# Patient Record
Sex: FEMALE | Race: WHITE | NOT HISPANIC OR LATINO | Employment: FULL TIME | URBAN - NONMETROPOLITAN AREA
[De-identification: names, ages, dates, MRNs, and addresses within clinical notes are randomized per-mention and may not be internally consistent; named-entity substitution may affect disease eponyms.]

---

## 2021-11-01 ENCOUNTER — OFFICE VISIT (OUTPATIENT)
Dept: ORTHOPEDIC SURGERY | Facility: CLINIC | Age: 51
End: 2021-11-01

## 2021-11-01 ENCOUNTER — HOSPITAL ENCOUNTER (OUTPATIENT)
Dept: GENERAL RADIOLOGY | Facility: HOSPITAL | Age: 51
Discharge: HOME | End: 2021-11-01
Admitting: PHYSICIAN ASSISTANT

## 2021-11-01 VITALS — HEIGHT: 66 IN | WEIGHT: 250 LBS | TEMPERATURE: 97 F | BODY MASS INDEX: 40.18 KG/M2

## 2021-11-01 DIAGNOSIS — M79.641 RIGHT HAND PAIN: Primary | ICD-10-CM

## 2021-11-01 DIAGNOSIS — M67.441 GANGLION, FINGER JOINT OF RIGHT HAND: Primary | ICD-10-CM

## 2021-11-01 PROCEDURE — 73130 X-RAY EXAM OF HAND: CPT

## 2021-11-01 PROCEDURE — 99203 OFFICE O/P NEW LOW 30 MIN: CPT | Performed by: PHYSICIAN ASSISTANT

## 2021-11-01 RX ORDER — CETIRIZINE HYDROCHLORIDE 10 MG/1
TABLET ORAL
COMMUNITY

## 2021-11-01 RX ORDER — MULTIVIT WITH MINERALS/LUTEIN
250 TABLET ORAL DAILY
COMMUNITY

## 2021-11-01 RX ORDER — LISINOPRIL 20 MG/1
20 TABLET ORAL
COMMUNITY

## 2021-11-01 RX ORDER — PHENOL 1.4 %
600 AEROSOL, SPRAY (ML) MUCOUS MEMBRANE DAILY
COMMUNITY

## 2021-11-01 RX ORDER — BUSPIRONE HYDROCHLORIDE 10 MG/1
TABLET ORAL
COMMUNITY

## 2021-11-01 RX ORDER — PSEUDOEPHEDRINE HCL 30 MG
TABLET ORAL
COMMUNITY

## 2021-11-01 NOTE — PROGRESS NOTES
"Chief Complaint  Pain of the Right Hand and Establish Care    Subjective    History of Present Illness      Megan Wang is a 51 y.o. female who presents to [facility] for complaint of a tender nodule on the thumb of the right hand. She reports it has been present for approximately 2 years without a known inciting event. She is only having pain when the area is accidentally hit. She has not noticed much change in the nodule, possibly a slight increase in size. It has never resolved then returned, it is constantly present.        Objective   Vital Signs:   Temp 97 °F (36.1 °C)   Ht 167.6 cm (66\")   Wt 113 kg (250 lb)   BMI 40.35 kg/m²     Physical Exam  Vitals signs and nursing note reviewed.   Constitutional:       Appearance: Normal appearance.   Pulmonary:      Effort: Pulmonary effort is normal.   Skin:     General: Skin is warm and dry.      Capillary Refill: Capillary refill takes less than 2 seconds.   Neurological:      General: No focal deficit present.      Mental Status: He is alert and oriented to person, place, and time. Mental status is at baseline.   Psychiatric:         Mood and Affect: Mood normal.         Behavior: Behavior normal.         Thought Content: Thought content normal.         Judgment: Judgment normal.     Ortho Exam   RIGHT hand  The patient is right hand dominant.   There is a tiny translucent mass about 0.5cm in diameter noted over the dorsum of the MCP joint of the thumb, more radial.  There are no abnormal pulsations or bruits.   Neurovascular status is intact.   Median nerve function is well preserved.   Local tenderness is diffuse and ill-defined and there is an aching sensation upon direct palpation of the wrist.   Radial artery pulses are palpable. Median and ulnar nerve functions are both well preserved.   There is no evidence of erosive changes into the underlying bone and the symptoms on palpation are essentially in the soft " tissues.      Result Review :   Radiologic studies - see below for interpretation  RIGHT hand xrays   3 views were ordered by Orlando Dickinson PA-C. Performed at [facility] on 11/01/2021. Images were independently viewed and interpreted by myself, my impression as follows:  Findings: Mild degenerative changes of the CMC joint of the thumb  Bony lesion: no  Soft tissues: within normal limits  Joint spaces: subnormal  Hardware appropriately positioned: not applicable  Prior studies available for comparison: no           PROCEDURE  Procedures           Assessment   Assessment and Plan    Problem List Items Addressed This Visit        Musculoskeletal and Injuries    Ganglion, finger joint of right hand - Primary          Follow Up   · Discussion of any imaging in detail. Discussion of orthopaedic goals.  · Risk, benefits, and merits of treatment alternatives reviewed with the patient. Treatment alternatives include: NSAIDS, surgery with excision. At this point it is not bothering her enough to have it removed. She reports she just wanted to make sure it is nothing she should be concerned over.   · Ice, heat, and/or modalities as beneficial  · Patient is encouraged to call or return for any issues or concerns.  · Follow up as needed  • Patient was given instructions and counseling regarding her condition or for health maintenance advice. Please see specific information pulled into the AVS if appropriate.     Orlando Dickinson PA-C   Date of Encounter: 11/1/2021   Electronically signed by Orlando Dickinson PA-C, 11/01/21, 9:28 AM EDT.     EMR Dragon/Transcription disclaimer:  Much of this encounter note is an electronic transcription/translation of spoken language to printed text. The electronic translation of spoken language may permit erroneous, or at times, nonsensical words or phrases to be inadvertently transcribed; Although I have reviewed the note for such errors, some may still  exist.

## 2021-11-05 ENCOUNTER — PATIENT ROUNDING (BHMG ONLY) (OUTPATIENT)
Dept: ORTHOPEDIC SURGERY | Facility: CLINIC | Age: 51
End: 2021-11-05

## 2021-11-05 NOTE — PROGRESS NOTES
November 5, 2021    Hello, may I speak with Megan Wang?    My name is Zenia Davis        I am  with [facility]  [address] [facility] [address]  [facility] [address] [phone].    Before we get started may I verify your date of birth? 1970    I am calling to officially welcome you to our practice and ask about your recent visit. Is this a good time to talk? yes    Tell me about your visit with us. What things went well?  Everything was just fine.        We're always looking for ways to make our patients' experiences even better. Do you have recommendations on ways we may improve?  no    Overall were you satisfied with your first visit to our practice? yes       I appreciate you taking the time to speak with me today. Is there anything else I can do for you? no      Thank you, and have a great day.

## 2022-10-27 ENCOUNTER — OFFICE VISIT (OUTPATIENT)
Dept: ORTHOPEDIC SURGERY | Facility: CLINIC | Age: 52
End: 2022-10-27

## 2022-10-27 VITALS — WEIGHT: 245 LBS | TEMPERATURE: 97.6 F | HEIGHT: 66 IN | BODY MASS INDEX: 39.37 KG/M2

## 2022-10-27 DIAGNOSIS — M67.441 GANGLION, FINGER JOINT OF RIGHT HAND: Primary | ICD-10-CM

## 2022-10-27 PROCEDURE — 99214 OFFICE O/P EST MOD 30 MIN: CPT | Performed by: ORTHOPAEDIC SURGERY

## 2022-11-22 ENCOUNTER — PREP FOR SURGERY (OUTPATIENT)
Dept: OTHER | Facility: HOSPITAL | Age: 52
End: 2022-11-22

## 2022-11-22 ENCOUNTER — TELEPHONE (OUTPATIENT)
Dept: ORTHOPEDIC SURGERY | Facility: CLINIC | Age: 52
End: 2022-11-22

## 2022-11-22 DIAGNOSIS — M67.441 GANGLION, FINGER JOINT OF RIGHT HAND: Primary | ICD-10-CM

## 2022-11-22 RX ORDER — CLINDAMYCIN PHOSPHATE 900 MG/50ML
900 INJECTION INTRAVENOUS ONCE
Status: CANCELLED | OUTPATIENT
Start: 2022-12-23 | End: 2022-11-22

## 2022-11-28 ENCOUNTER — TELEPHONE (OUTPATIENT)
Dept: ORTHOPEDIC SURGERY | Facility: CLINIC | Age: 52
End: 2022-11-28

## 2022-11-28 NOTE — TELEPHONE ENCOUNTER
Explained to patient that Dr Sue does not do surgery at [place] now.  She would like to schedule excision of ganglion as soon as possible.  She hopes for before the end of the year.   I did explain that may not be possible as those dates book up pretty quickly.   She understands and is awaiting a call back.     bsw

## 2022-11-28 NOTE — TELEPHONE ENCOUNTER
Caller: Megan Wang    Relationship: Self    Best call back number:     What is the best time to reach you: ANY     Who are you requesting to speak with (clinical staff, provider,  specific staff member): CLINICAL    What was the call regarding: PATIENT WOULD LIKE TO MOVE SURGERY FROM [place] TO [place] AT [facility]     Do you require a callback: YES

## 2022-12-05 DIAGNOSIS — Z01.818 PRE-OP EXAMINATION: ICD-10-CM

## 2022-12-05 DIAGNOSIS — M67.441 GANGLION, FINGER JOINT OF RIGHT HAND: Primary | ICD-10-CM

## 2022-12-15 ENCOUNTER — APPOINTMENT (OUTPATIENT)
Dept: PREADMISSION TESTING | Facility: HOSPITAL | Age: 52
End: 2022-12-15

## 2022-12-22 ENCOUNTER — ANESTHESIA EVENT (OUTPATIENT)
Dept: PERIOP | Facility: HOSPITAL | Age: 52
End: 2022-12-22

## 2022-12-22 RX ORDER — PHENTERMINE HYDROCHLORIDE 15 MG/1
15 CAPSULE ORAL EVERY MORNING
COMMUNITY
Start: 2022-11-30

## 2022-12-22 RX ORDER — TIRZEPATIDE 7.5 MG/.5ML
INJECTION, SOLUTION SUBCUTANEOUS WEEKLY
COMMUNITY
Start: 2022-11-09

## 2022-12-22 RX ORDER — AMLODIPINE BESYLATE 5 MG/1
5 TABLET ORAL DAILY
COMMUNITY

## 2022-12-22 RX ORDER — FAMOTIDINE 20 MG/1
20 TABLET, FILM COATED ORAL DAILY
COMMUNITY
Start: 2022-11-30

## 2022-12-23 ENCOUNTER — HOSPITAL ENCOUNTER (OUTPATIENT)
Facility: HOSPITAL | Age: 52
Setting detail: HOSPITAL OUTPATIENT SURGERY
Discharge: HOME | End: 2022-12-23
Attending: ORTHOPAEDIC SURGERY | Admitting: ORTHOPAEDIC SURGERY

## 2022-12-23 ENCOUNTER — ANESTHESIA (OUTPATIENT)
Dept: PERIOP | Facility: HOSPITAL | Age: 52
End: 2022-12-23

## 2022-12-23 VITALS
WEIGHT: 229.28 LBS | BODY MASS INDEX: 36.85 KG/M2 | OXYGEN SATURATION: 98 % | DIASTOLIC BLOOD PRESSURE: 85 MMHG | HEART RATE: 74 BPM | TEMPERATURE: 97.6 F | HEIGHT: 66 IN | RESPIRATION RATE: 16 BRPM | SYSTOLIC BLOOD PRESSURE: 137 MMHG

## 2022-12-23 DIAGNOSIS — M67.441 GANGLION, FINGER JOINT OF RIGHT HAND: Primary | ICD-10-CM

## 2022-12-23 DIAGNOSIS — M67.441 GANGLION, FINGER JOINT OF RIGHT HAND: ICD-10-CM

## 2022-12-23 PROCEDURE — 25010000002 MIDAZOLAM PER 1 MG: Performed by: ANESTHESIOLOGY

## 2022-12-23 PROCEDURE — 88342 IMHCHEM/IMCYTCHM 1ST ANTB: CPT | Performed by: ORTHOPAEDIC SURGERY

## 2022-12-23 PROCEDURE — 25010000002 PROPOFOL 10 MG/ML EMULSION: Performed by: NURSE ANESTHETIST, CERTIFIED REGISTERED

## 2022-12-23 PROCEDURE — 88341 IMHCHEM/IMCYTCHM EA ADD ANTB: CPT | Performed by: ORTHOPAEDIC SURGERY

## 2022-12-23 PROCEDURE — 26160 REMOVE TENDON SHEATH LESION: CPT | Performed by: ORTHOPAEDIC SURGERY

## 2022-12-23 PROCEDURE — C9290 INJ, BUPIVACAINE LIPOSOME: HCPCS | Performed by: ORTHOPAEDIC SURGERY

## 2022-12-23 PROCEDURE — 25010000002 ONDANSETRON PER 1 MG: Performed by: ORTHOPAEDIC SURGERY

## 2022-12-23 PROCEDURE — 88307 TISSUE EXAM BY PATHOLOGIST: CPT | Performed by: ORTHOPAEDIC SURGERY

## 2022-12-23 PROCEDURE — 88360 TUMOR IMMUNOHISTOCHEM/MANUAL: CPT | Performed by: ORTHOPAEDIC SURGERY

## 2022-12-23 PROCEDURE — S0260 H&P FOR SURGERY: HCPCS | Performed by: ORTHOPAEDIC SURGERY

## 2022-12-23 PROCEDURE — 25010000002 FENTANYL CITRATE (PF) 100 MCG/2ML SOLUTION: Performed by: NURSE ANESTHETIST, CERTIFIED REGISTERED

## 2022-12-23 PROCEDURE — 0 BUPIVACAINE LIPOSOME 1.3 % SUSPENSION: Performed by: ORTHOPAEDIC SURGERY

## 2022-12-23 RX ORDER — PROMETHAZINE HYDROCHLORIDE 25 MG/1
25 SUPPOSITORY RECTAL ONCE AS NEEDED
Status: DISCONTINUED | OUTPATIENT
Start: 2022-12-23 | End: 2022-12-23 | Stop reason: HOSPADM

## 2022-12-23 RX ORDER — FENTANYL CITRATE 50 UG/ML
50 INJECTION, SOLUTION INTRAMUSCULAR; INTRAVENOUS
Status: DISCONTINUED | OUTPATIENT
Start: 2022-12-23 | End: 2022-12-23 | Stop reason: HOSPADM

## 2022-12-23 RX ORDER — PROPOFOL 10 MG/ML
VIAL (ML) INTRAVENOUS AS NEEDED
Status: DISCONTINUED | OUTPATIENT
Start: 2022-12-23 | End: 2022-12-23 | Stop reason: SURG

## 2022-12-23 RX ORDER — ONDANSETRON 2 MG/ML
4 INJECTION INTRAMUSCULAR; INTRAVENOUS ONCE AS NEEDED
Status: DISCONTINUED | OUTPATIENT
Start: 2022-12-23 | End: 2022-12-23 | Stop reason: HOSPADM

## 2022-12-23 RX ORDER — EPHEDRINE SULFATE 50 MG/ML
5 INJECTION, SOLUTION INTRAVENOUS ONCE AS NEEDED
Status: DISCONTINUED | OUTPATIENT
Start: 2022-12-23 | End: 2022-12-23 | Stop reason: HOSPADM

## 2022-12-23 RX ORDER — LIDOCAINE HYDROCHLORIDE 20 MG/ML
INJECTION, SOLUTION INFILTRATION; PERINEURAL AS NEEDED
Status: DISCONTINUED | OUTPATIENT
Start: 2022-12-23 | End: 2022-12-23 | Stop reason: SURG

## 2022-12-23 RX ORDER — SODIUM CHLORIDE 0.9 % (FLUSH) 0.9 %
3 SYRINGE (ML) INJECTION EVERY 12 HOURS SCHEDULED
Status: DISCONTINUED | OUTPATIENT
Start: 2022-12-23 | End: 2022-12-23 | Stop reason: HOSPADM

## 2022-12-23 RX ORDER — FLUMAZENIL 0.1 MG/ML
0.2 INJECTION INTRAVENOUS AS NEEDED
Status: DISCONTINUED | OUTPATIENT
Start: 2022-12-23 | End: 2022-12-23 | Stop reason: HOSPADM

## 2022-12-23 RX ORDER — HYDRALAZINE HYDROCHLORIDE 20 MG/ML
5 INJECTION INTRAMUSCULAR; INTRAVENOUS
Status: DISCONTINUED | OUTPATIENT
Start: 2022-12-23 | End: 2022-12-23 | Stop reason: HOSPADM

## 2022-12-23 RX ORDER — LIDOCAINE HYDROCHLORIDE 10 MG/ML
0.5 INJECTION, SOLUTION EPIDURAL; INFILTRATION; INTRACAUDAL; PERINEURAL ONCE AS NEEDED
Status: DISCONTINUED | OUTPATIENT
Start: 2022-12-23 | End: 2022-12-23 | Stop reason: HOSPADM

## 2022-12-23 RX ORDER — MAGNESIUM HYDROXIDE 1200 MG/15ML
LIQUID ORAL AS NEEDED
Status: DISCONTINUED | OUTPATIENT
Start: 2022-12-23 | End: 2022-12-23 | Stop reason: HOSPADM

## 2022-12-23 RX ORDER — CLINDAMYCIN PHOSPHATE 900 MG/50ML
900 INJECTION INTRAVENOUS ONCE
Status: COMPLETED | OUTPATIENT
Start: 2022-12-23 | End: 2022-12-23

## 2022-12-23 RX ORDER — LABETALOL HYDROCHLORIDE 5 MG/ML
5 INJECTION, SOLUTION INTRAVENOUS
Status: DISCONTINUED | OUTPATIENT
Start: 2022-12-23 | End: 2022-12-23 | Stop reason: HOSPADM

## 2022-12-23 RX ORDER — DIPHENHYDRAMINE HYDROCHLORIDE 50 MG/ML
12.5 INJECTION INTRAMUSCULAR; INTRAVENOUS
Status: DISCONTINUED | OUTPATIENT
Start: 2022-12-23 | End: 2022-12-23 | Stop reason: HOSPADM

## 2022-12-23 RX ORDER — FAMOTIDINE 10 MG/ML
20 INJECTION, SOLUTION INTRAVENOUS ONCE
Status: COMPLETED | OUTPATIENT
Start: 2022-12-23 | End: 2022-12-23

## 2022-12-23 RX ORDER — SODIUM CHLORIDE 0.9 % (FLUSH) 0.9 %
3-10 SYRINGE (ML) INJECTION AS NEEDED
Status: DISCONTINUED | OUTPATIENT
Start: 2022-12-23 | End: 2022-12-23 | Stop reason: HOSPADM

## 2022-12-23 RX ORDER — DIPHENHYDRAMINE HCL 25 MG
25 CAPSULE ORAL
Status: DISCONTINUED | OUTPATIENT
Start: 2022-12-23 | End: 2022-12-23 | Stop reason: HOSPADM

## 2022-12-23 RX ORDER — HYDROCODONE BITARTRATE AND ACETAMINOPHEN 5; 325 MG/1; MG/1
1 TABLET ORAL EVERY 4 HOURS PRN
Qty: 30 TABLET | Refills: 0 | Status: SHIPPED | OUTPATIENT
Start: 2022-12-23

## 2022-12-23 RX ORDER — OXYCODONE AND ACETAMINOPHEN 7.5; 325 MG/1; MG/1
1 TABLET ORAL EVERY 4 HOURS PRN
Status: DISCONTINUED | OUTPATIENT
Start: 2022-12-23 | End: 2022-12-23 | Stop reason: HOSPADM

## 2022-12-23 RX ORDER — PROMETHAZINE HYDROCHLORIDE 25 MG/1
25 TABLET ORAL ONCE AS NEEDED
Status: DISCONTINUED | OUTPATIENT
Start: 2022-12-23 | End: 2022-12-23 | Stop reason: HOSPADM

## 2022-12-23 RX ORDER — SODIUM CHLORIDE, SODIUM LACTATE, POTASSIUM CHLORIDE, CALCIUM CHLORIDE 600; 310; 30; 20 MG/100ML; MG/100ML; MG/100ML; MG/100ML
9 INJECTION, SOLUTION INTRAVENOUS CONTINUOUS
Status: DISCONTINUED | OUTPATIENT
Start: 2022-12-23 | End: 2022-12-23 | Stop reason: HOSPADM

## 2022-12-23 RX ORDER — HYDROCODONE BITARTRATE AND ACETAMINOPHEN 5; 325 MG/1; MG/1
1 TABLET ORAL ONCE AS NEEDED
Status: DISCONTINUED | OUTPATIENT
Start: 2022-12-23 | End: 2022-12-23 | Stop reason: HOSPADM

## 2022-12-23 RX ORDER — ROPIVACAINE HYDROCHLORIDE 5 MG/ML
INJECTION, SOLUTION EPIDURAL; INFILTRATION; PERINEURAL AS NEEDED
Status: DISCONTINUED | OUTPATIENT
Start: 2022-12-23 | End: 2022-12-23 | Stop reason: HOSPADM

## 2022-12-23 RX ORDER — HYDROMORPHONE HYDROCHLORIDE 1 MG/ML
0.5 INJECTION, SOLUTION INTRAMUSCULAR; INTRAVENOUS; SUBCUTANEOUS
Status: DISCONTINUED | OUTPATIENT
Start: 2022-12-23 | End: 2022-12-23 | Stop reason: HOSPADM

## 2022-12-23 RX ORDER — MIDAZOLAM HYDROCHLORIDE 1 MG/ML
1 INJECTION INTRAMUSCULAR; INTRAVENOUS
Status: DISCONTINUED | OUTPATIENT
Start: 2022-12-23 | End: 2022-12-23 | Stop reason: HOSPADM

## 2022-12-23 RX ORDER — FENTANYL CITRATE 50 UG/ML
INJECTION, SOLUTION INTRAMUSCULAR; INTRAVENOUS AS NEEDED
Status: DISCONTINUED | OUTPATIENT
Start: 2022-12-23 | End: 2022-12-23 | Stop reason: SURG

## 2022-12-23 RX ORDER — SODIUM PHOSPHATE,MONO-DIBASIC 19G-7G/118
ENEMA (ML) RECTAL
COMMUNITY

## 2022-12-23 RX ORDER — ONDANSETRON 2 MG/ML
4 INJECTION INTRAMUSCULAR; INTRAVENOUS ONCE AS NEEDED
Status: COMPLETED | OUTPATIENT
Start: 2022-12-23 | End: 2022-12-23

## 2022-12-23 RX ORDER — BUPROPION HYDROCHLORIDE 150 MG/1
150 TABLET ORAL DAILY
COMMUNITY

## 2022-12-23 RX ORDER — NALOXONE HCL 0.4 MG/ML
0.2 VIAL (ML) INJECTION AS NEEDED
Status: DISCONTINUED | OUTPATIENT
Start: 2022-12-23 | End: 2022-12-23 | Stop reason: HOSPADM

## 2022-12-23 RX ORDER — HYDROCODONE BITARTRATE AND ACETAMINOPHEN 7.5; 325 MG/1; MG/1
1 TABLET ORAL ONCE AS NEEDED
Status: DISCONTINUED | OUTPATIENT
Start: 2022-12-23 | End: 2022-12-23 | Stop reason: HOSPADM

## 2022-12-23 RX ADMIN — Medication 3 ML: at 10:20

## 2022-12-23 RX ADMIN — SODIUM CHLORIDE, POTASSIUM CHLORIDE, SODIUM LACTATE AND CALCIUM CHLORIDE 9 ML/HR: 600; 310; 30; 20 INJECTION, SOLUTION INTRAVENOUS at 10:20

## 2022-12-23 RX ADMIN — CLINDAMYCIN IN 5 PERCENT DEXTROSE 900 MG: 18 INJECTION, SOLUTION INTRAVENOUS at 12:55

## 2022-12-23 RX ADMIN — LIDOCAINE HYDROCHLORIDE 60 MG: 20 INJECTION, SOLUTION INFILTRATION; PERINEURAL at 13:06

## 2022-12-23 RX ADMIN — ONDANSETRON 4 MG: 2 INJECTION INTRAMUSCULAR; INTRAVENOUS at 13:42

## 2022-12-23 RX ADMIN — FENTANYL CITRATE 25 MCG: 50 INJECTION INTRAMUSCULAR; INTRAVENOUS at 13:12

## 2022-12-23 RX ADMIN — MIDAZOLAM 1 MG: 1 INJECTION INTRAMUSCULAR; INTRAVENOUS at 11:03

## 2022-12-23 RX ADMIN — PROPOFOL 200 MG: 10 INJECTION, EMULSION INTRAVENOUS at 13:06

## 2022-12-23 RX ADMIN — FAMOTIDINE 20 MG: 10 INJECTION INTRAVENOUS at 11:03

## 2022-12-23 NOTE — H&P
History & Physical       Patient: Megan Wang    Date of Admission: 12/23/2022  9:06 AM    YOB: 1970    Medical Record Number: [id number]    Attending Physician: Steven Sue MD        Chief Complaints: Ganglion, finger joint of right hand [M67.441]      History of Present Illness: 52 y.o. female presents with Ganglion, finger joint of right hand [M67.441]. Onset of symptoms was gradual over the past 2 to 3 months.  Symptoms are associated with painful mass over the dorsal aspect of the thumb at the base of the distal phalanx.  Symptoms are aggravated by flexion and extension of the digit.   Symptoms improve with anti-inflammatory medication. Patient is now being admitted to the services of Steven Sue MD for further evaluation and treatment.        Allergies   Allergen Reactions   • Penicillins Hives and Unknown - High Severity   • Adhesive Tape Rash         Home Medications:  Medications Prior to Admission   Medication Sig Dispense Refill Last Dose   • amLODIPine (NORVASC) 5 MG tablet Take 5 mg by mouth Daily.   12/23/2022 at 0820   • buPROPion XL (WELLBUTRIN XL) 150 MG 24 hr tablet Take 150 mg by mouth Daily.   12/22/2022   • busPIRone (BUSPAR) 10 MG tablet buspirone 10 mg tablet   12/22/2022   • calcium carbonate (OS-BAMBI) 600 MG tablet Take 600 mg by mouth Daily.   12/22/2022   • cetirizine (zyrTEC) 10 MG tablet Zyrtec 10 mg tablet   Take 1 tablet every day by oral route.   12/22/2022   • docusate sodium 100 MG capsule docusate sodium 100 mg capsule   Take 1 capsule every day by oral route.   12/22/2022   • famotidine (PEPCID) 20 MG tablet Take 20 mg by mouth Daily.   12/23/2022 at 0800   • glucosamine sulfate 500 MG capsule capsule Take  by mouth 3 (Three) Times a Day With Meals.   12/22/2022   • Mounjaro 7.5 MG/0.5ML solution pen-injector Inject  under the skin into the appropriate area as directed 1 (One) Time Per Week. LD DEC 12   Past Week   • phentermine 15 MG capsule Take 15 mg by  mouth Every Morning. LD 12/8/2022. STOPPED FOR SURGERY ON 12/23 12/22/2022   • vitamin C (ASCORBIC ACID) 250 MG tablet Take 250 mg by mouth Daily.   12/22/2022   • vitamin D3 125 MCG (5000 UT) capsule capsule Take 5,000 Units by mouth Daily.   12/22/2022   • Zinc 15 MG capsule Take  by mouth.   12/22/2022   • lisinopril (PRINIVIL,ZESTRIL) 20 MG tablet Take 20 mg by mouth.          Current Medications:  Scheduled Meds:clindamycin, 900 mg, Intravenous, Once  sodium chloride, 3 mL, Intravenous, Q12H      Continuous Infusions:lactated ringers, 9 mL/hr, Last Rate: 9 mL/hr (12/23/22 1020)      PRN Meds:.•  fentanyl  •  lidocaine PF 1%  •  midazolam  •  ondansetron  •  sodium chloride       Past Medical History:   Diagnosis Date   • Constipation    • MICHELLE (generalized anxiety disorder)    • Ganglion, finger joint of right hand    • GERD (gastroesophageal reflux disease)    • Hypertension         Past Surgical History:   Procedure Laterality Date   • BREAST SURGERY     • CHOLECYSTECTOMY          Social History     Occupational History   • Not on file   Tobacco Use   • Smoking status: Never   • Smokeless tobacco: Never   Vaping Use   • Vaping Use: Never used   Substance and Sexual Activity   • Alcohol use: Never   • Drug use: Never   • Sexual activity: Yes     Partners: Male     Birth control/protection: None, Same-sex partner      Social History     Social History Narrative   • Not on file        Family History   Problem Relation Age of Onset   • Malig Hyperthermia Neg Hx          Review of Systems:   HEENT: Patient denies any headaches, vision changes, change in hearing, or tinnitus, Patient denies any rhinorrhea,epistaxis, sinus pain, mouth or dental problems, sore throat or hoarseness, or dysphagia  Pulmonary: Patient denies any cough, congestion, SOA, or wheezing  Cardiovascular: Patient denies any chest pain, dyspnea, palpitations, weakness, intolerance of exercise, varicosities, swelling of extremities, known  "murmur  Gastrointestinal:  Patient denies nausea, vomiting, diarrhea, constipation, loss  of appetite, change in appetite, dysphagia, gas, heartburn, melena, change in bowel habits, use of laxatives or other drugs to alter the function of the gastrointestinal tract.  Genital/Urinary: Patient denies dysuria, change in color of urine, change in frequency of urination, pain with urgency, incontinence, retention, or nocturia.  Musculoskeletal: Patient denies increased warmth; redness; or swelling of joints; limitation of function; deformity; crepitation: pain in a joint or an extremity, the neck, or the back, especially with movement.  Neurological: Patient denies dizziness, tremor, ataxia, difficulty in speaking, change in speech, paresthesia, loss of sensation, seizures, syncope, changes in memory.  Endocrine system: Patient denies tremors, palpitations, intolerance of heat or cold, polyuria, polydipsia, polyphagia, diaphoresis, exophthalmos, or goiter.  Psychological: Patient denies thoughts/plans or harming self or other; depression,  insomnia, night terrors, megan, memory loss, disorientation.  Skin: Patient denies any bruising, rashes, discoloration, pruritus, wounds, ulcers, decubiti, changes in the hair or nails  Hematopoietic: Patient denies history of spontaneous or excessive bleeding, epistaxis, hematuria, melena, fatigue, enlarged or tender lymph nodes, pallor, history of anemia.    Physical Exam: 52 y.o. female  Vitals:    12/22/22 1641 12/23/22 0934   BP:  134/86   BP Location:  Right arm   Patient Position:  Sitting   Pulse:  95   Resp:  18   Temp:  98.4 °F (36.9 °C)   TempSrc:  Oral   SpO2:  99%   Weight: 104 kg (230 lb) 104 kg (229 lb 4.5 oz)   Height: 167.6 cm (66\")        General Appearance:          Alert, cooperative, in no acute distress                                                 Head:    Normocephalic, without obvious abnormality, atraumatic   Eyes:            Lids and lashes normal, " conjunctivae and sclerae normal, no   icterus, no pallor, corneas clear, PERRLA   Ears:    Ears appear intact with no abnormalities noted   Throat:   No oral lesions, no thrush, oral mucosa moist   Neck:   No adenopathy, supple, trachea midline, no thyromegaly, no   carotid bruit, no JVD   Back:     No kyphosis present, no scoliosis present, no skin lesions,      erythema or scars, no tenderness to percussion or                   palpation,   range of motion normal   Lungs:     Clear to auscultation,respirations regular, even and                  unlabored    Heart:    Regular rhythm and normal rate, normal S1 and S2, no            murmur, no gallop, no rub, no click   Chest Wall:    No abnormalities observed   Abdomen:     Normal bowel sounds, no masses, no organomegaly, soft        nontender, nondistended, no guarding, no rebound                tenderness   Rectal:     Deferred   Extremities:   Tenderness over dorsal aspect of the right thumb. Moves all extremities well, no edema,   no cyanosis, no redness   Pulses:   Pulses palpable and equal bilaterally   Skin:   No bleeding, bruising or rash   Lymph nodes:   No palpable adenopathy   Neurologic:   Cranial nerves 2 - 12 grossly intact, sensation intact, DTR       present and equal bilaterally           Diagnostic Tests:  No visits with results within 2 Day(s) from this visit.   Latest known visit with results is:   No results found for any previous visit.     No results found.      Assessment:  Patient Active Problem List   Diagnosis   • Ganglion, finger joint of right hand         Plan:  The patient voiced understanding of the risks, benefits, and alternative forms of treatment that were discussed and the patient consents to proceed with excision of mass over the dorsal aspect of the right thumb.   Recent benefits of resection of the mass including specifically neurovascular compromise, numbness and tingling, resection of the mass followed by recurrence, if it  is a malignant mass then we would have to refer her to a hand surgery specialist or an oncologist.  All these risks and benefits discussed with the patient at length she understands and accepts and wants to proceed with surgical intervention.    Discharge Plan: today to home      Date: 12/23/2022    Steven Sue MD      DICTATED UTILIZING DRAGON DICTATION

## 2022-12-23 NOTE — ANESTHESIA PREPROCEDURE EVALUATION
Anesthesia Evaluation     Patient summary reviewed and Nursing notes reviewed   NPO Solid Status: > 8 hours  NPO Liquid Status: > 2 hours           Airway   Mallampati: II  TM distance: >3 FB  Neck ROM: full  no difficulty expected  Dental - normal exam     Pulmonary - negative pulmonary ROS and normal exam   (-) decreased breath sounds, wheezes  Cardiovascular - normal exam  Exercise tolerance: good (4-7 METS)    (+) hypertension,       Neuro/Psych  (+) psychiatric history Anxiety,    (-) seizures, CVA  GI/Hepatic/Renal/Endo    (+) obesity,  GERD,    (-) diabetes    Musculoskeletal (-) negative ROS    Abdominal  - normal exam   Substance History - negative use  (-) alcohol use, drug use     OB/GYN negative ob/gyn ROS         Other - negative ROS                       Anesthesia Plan    ASA 3     general     intravenous induction     Anesthetic plan, risks, benefits, and alternatives have been provided, discussed and informed consent has been obtained with: patient.    Plan discussed with CRNA.        CODE STATUS:

## 2022-12-23 NOTE — ANESTHESIA PROCEDURE NOTES
Airway  Urgency: elective    Date/Time: 12/23/2022 1:10 PM  Airway not difficult    General Information and Staff    Patient location during procedure: OR  Anesthesiologist: Norman Medina MD  CRNA/CAA: Jessica Sanchez CRNA    Indications and Patient Condition  Indications for airway management: airway protection    Preoxygenated: yes  MILS not maintained throughout  Mask difficulty assessment: 1 - vent by mask    Final Airway Details  Final airway type: supraglottic airway      Successful airway: classic  Size 4     Number of attempts at approach: 1    Additional Comments  Preoxygenation FEO2 >85, SIVI, LMA placed with ease, teeth/lips as preop. Secured and placement confirmed.

## 2022-12-23 NOTE — ANESTHESIA POSTPROCEDURE EVALUATION
"Patient: Megan Wang    Procedure Summary     Date: 12/23/22 Room / Location:  [facility] [address] [place] [address] [facility]    Anesthesia Start: 1257 Anesthesia Stop: 1359    Procedure: Excision of a mass on the dorsum of the right thumb (Right) Diagnosis:       Ganglion, finger joint of right hand      (Ganglion, finger joint of right hand [M67.441])    Surgeons: Steven Sue MD Provider: Norman Medina MD    Anesthesia Type: general ASA Status: 3          Anesthesia Type: general    Vitals  Vitals Value Taken Time   /79 12/23/22 1430   Temp 36.4 °C (97.6 °F) 12/23/22 1357   Pulse 78 12/23/22 1442   Resp 16 12/23/22 1430   SpO2 100 % 12/23/22 1442   Vitals shown include unvalidated device data.        Post Anesthesia Care and Evaluation    Patient location during evaluation: bedside  Patient participation: complete - patient participated  Level of consciousness: awake  Pain management: adequate    Airway patency: patent  Anesthetic complications: No anesthetic complications    Cardiovascular status: acceptable  Respiratory status: acceptable  Hydration status: acceptable    Comments: /85   Pulse 74   Temp 36.4 °C (97.6 °F) (Oral)   Resp 16   Ht 167.6 cm (66\")   Wt 104 kg (229 lb 4.5 oz)   SpO2 98%   BMI 37.01 kg/m²       "

## 2022-12-24 NOTE — OP NOTE
CARPAL TUNNEL RELEASE:  PATIENT NAME: Megan Wang   PATIENT : 1970   DATE OF PROCEDURE: 2022   PRINCIPAL DIAGNOSIS: Ganglion cyst on the dorsal aspect of the right thumb.  SECONDARY DIAGNOSIS: Ganglion cyst on the dorsal aspect of the right thumb  POSTOPERATIVE DIAGNOSIS: Same.  PROCEDURE PERFORMED: Removal of ganglion cyst dorsal aspect of right thumb.  SURGEON: JENNIFER STEVENS M.D.  ASSISTANT: Clarice Burns first assistant was responsible for performing the following activities: Retraction, Suction, Irrigation, Suturing, Closing and Placing Dressing and their skilled assistance was necessary for the success of this case.    ANESTHESIOLOGIST: Dr. Larry Medina MD  ANESTHESIA USED: General with an LMA.  ANALGESIC PROCEDURE USED: 5 cc of Exparel were used for postoperative pain control.  ESTIMATED BLOOD LOSS: minimal  SPECIMENS:   Order Name Source Comment Collection Info Order Time   TISSUE PATHOLOGY EXAM Hand, Right  Collected By: Jennifer Stevens MD 2022  1:46 PM     Release to patient   Routine Release          IMPLANTS USED: None.  COMPLICATIONS (IF ANY): None.    INDICATIONS:  The patient has been having increasing pain, numbness and tingling in the upper extremity with a slightly increasing size of mass on the dorsal aspect of the right thumb.. Worse when the patient is driving.  All risks, benefits, potential complications of surgical intervention were discussed with the patient in great detail. All questions were answered for the patient.     PROCEDURE:  Surgical timeout was called. Operative extremity was correctly identified in the operating room suite. Patient was administered antibiotics per the SCIP protocol. Patient was placed under appropriate anesthesia. The arm was prepped and draped in the standard fashion.  The mass was identified on the dorsal aspect of the thumb.  A direct small incision was made dorsally.  The ganglion cyst was identified.  It was removed with sharp  dissection.  It was sent for histopathological exam.  Wounds were lavaged with Kefzol irrigating solution.  5 cc of Exparel were used locally for postoperative analgesia. Subcuticular sutures applied. Occlusive dressings applied. The skin incision was infiltrated with local anesthetic for postoperative analgesia.        The patient tolerated the procedure well. Was reversed from anesthetic and taken from the operating room to the recovery room in stable, satisfactory condition. Sponge, gauze and needle count was correct.  I discussed a satisfactory performance of this procedure with the patient’s family and answered all questions for them.    Steven Sue MD  12/23/2022  19:38 EST

## 2022-12-28 LAB
LAB AP CASE REPORT: NORMAL
LAB AP INTRADEPARTMENTAL CONSULT: NORMAL
LAB AP SPECIAL STAINS: NORMAL
PATH REPORT.FINAL DX SPEC: NORMAL
PATH REPORT.GROSS SPEC: NORMAL

## 2023-01-03 ENCOUNTER — OFFICE VISIT (OUTPATIENT)
Dept: ORTHOPEDIC SURGERY | Facility: CLINIC | Age: 53
End: 2023-01-03
Payer: COMMERCIAL

## 2023-01-03 VITALS
HEART RATE: 86 BPM | WEIGHT: 227 LBS | HEIGHT: 66 IN | TEMPERATURE: 98.2 F | RESPIRATION RATE: 20 BRPM | BODY MASS INDEX: 36.48 KG/M2

## 2023-01-03 DIAGNOSIS — M67.441 GANGLION, FINGER JOINT OF RIGHT HAND: ICD-10-CM

## 2023-01-03 DIAGNOSIS — Z98.890 S/P EXCISION OF GANGLION CYST: Primary | ICD-10-CM

## 2023-01-03 PROCEDURE — 99024 POSTOP FOLLOW-UP VISIT: CPT | Performed by: PHYSICIAN ASSISTANT

## 2023-01-03 NOTE — PROGRESS NOTES
OTHER POST OP GLOBAL     NAME: Megan Wang  ?  : 1970  ?  MRN: [id number]    Chief Complaint   Patient presents with   • Right Thumb - Follow-up   • Wound Check     Pt. States 'it feels as if it is pulling\"     ?  Date of surgery: 2022    HPI:   Patient returns today for 11 day(s) follow up of right ganglion cyst excision. Incision(s) healing nicely/as expected with no signs of infection. Patient reports doing well with no unusual complaints. Appears to be progressing appropriately.  She reports the area feels like it is pulling with the bandages on from surgery.  With bandages removed she is not experiencing any discomfort.  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  She was having numbness and tingling prior to surgery, which has improved since surgery.  She reports when other similar area on her finger but no other symptoms.      Review of Systems:      Ortho Exam:   Right Dorsal Thumb  Right hand dominant  Incision site is clean and healing nicely.   Neurovascular status is intact.   Median nerve function and ulnar nerve function is well preserved.   Range of motion/volar and dorsiflexion of the wrist are well preserved.   Radial artery pulses are palpable.   There is no evidence of erosive changes into the underlying bone or evidence of infection.      Diagnostic Studies:  no diagnostic testing performed this visit      Assessment:  Diagnoses and all orders for this visit:    1. S/P excision of ganglion cyst (Primary)    2. Ganglion, finger joint of right hand          Plan   Incision care  Continue ice as needed  Gentle active ROM  Fall precautions  Follow up in 4-6 week(s)     Date of encounter: 2023  Orlando Dickinson PA-C        Electronically signed by Orlando Dickinson PA-C, 23, 2:10 PM EST.

## 2023-02-09 ENCOUNTER — OFFICE VISIT (OUTPATIENT)
Dept: ORTHOPEDIC SURGERY | Facility: CLINIC | Age: 53
End: 2023-02-09
Payer: COMMERCIAL

## 2023-02-09 VITALS — WEIGHT: 227 LBS | TEMPERATURE: 98.2 F | BODY MASS INDEX: 36.48 KG/M2 | HEIGHT: 66 IN

## 2023-02-09 DIAGNOSIS — Z98.890 S/P EXCISION OF GANGLION CYST: Primary | ICD-10-CM

## 2023-02-09 PROCEDURE — 99024 POSTOP FOLLOW-UP VISIT: CPT | Performed by: ORTHOPAEDIC SURGERY

## 2023-02-09 NOTE — PROGRESS NOTES
OTHER POST OP GLOBAL     NAME: Megan Wang  ?  : 1970  ?  MRN: [id number]    Chief Complaint   Patient presents with   • Right Thumb - Post-op, Follow-up     ?  Date of surgery: The patient underwent a right thumb dorsal mass excision on 2022.    HPI:   Patient returns today for 7 week follow up of right Thumb mass dorsally located excision. . Incision(s) healed nicely with no signs of infection. Patient reports doing well with no unusual complaints. Appears to be progressing appropriately.      Ortho Exam:   RIGHT Thumb/ finger  Right thumb: There is no clinical deformity.  Patient is neurovascularly intact.  Flexor and extensor tendon function are both well preserved.  There is no evidence of a localized infection.  She has full flexion at the MCP and the IP joints of the thumb.  She is able to circumduct the thumb without any difficulty.      Diagnostic Studies:        Assessment:  Diagnoses and all orders for this visit:    1. S/P excision of ganglion cyst (Primary)          Plan   • Incision care  • Continue ice as needed  • Gentle active ROM  • Stretching and strengthening exercises  • Fall precautions  • Follow up in 3 month(s)     Date of encounter: 2023  Steven Sue MD

## 2023-08-10 ENCOUNTER — OFFICE VISIT (OUTPATIENT)
Dept: ORTHOPEDIC SURGERY | Facility: CLINIC | Age: 53
End: 2023-08-10
Payer: COMMERCIAL

## 2023-08-10 VITALS — WEIGHT: 192 LBS | HEIGHT: 66 IN | TEMPERATURE: 97.2 F | BODY MASS INDEX: 30.86 KG/M2

## 2023-08-10 DIAGNOSIS — Z98.890 S/P EXCISION OF GANGLION CYST: Primary | ICD-10-CM

## 2023-08-10 DIAGNOSIS — M67.441 GANGLION, FINGER JOINT OF RIGHT HAND: ICD-10-CM

## 2023-08-10 NOTE — PROGRESS NOTES
"Chief Complaint  Follow-up of the Right Thumb (Mass excision 12/23/22)    Subjective    History of Present Illness      Megan Wang is a 52 y.o. female who presents to [facility] for follow-up on right thumb mass resection.  History of Present Illness this patient had a cystic mass resected from the thumb in December 2022.  She has done extremely well postoperatively.  Initially she did have some swelling which has now completely resolved.  She also had some numbness and tingling which is once again settled down.  She has regained full use of the thumb without any difficulty whatsoever.  She is very pleased with her postoperative outcome.  She states \"I am doing really good after surgery and my thumb is working perfectly \".  No recurrences of the thumb mass have been reported by the patient.  Pain Location:  RIGHT hand  Radiation: none  Quality: dull  Intensity/Severity: none  Duration:  3-4 years  Progression of symptoms: no worsening, symptoms stable/unchanged  Onset quality: gradual   Timing: intermittent  Aggravating Factors: repetitive motion  Alleviating Factors: NSAIDs, rest, brace  Previous Episodes: yes  Associated Symptoms: pain  ADLs Affected: work related activities  Previous Treatment: NSAIDs, brace, and prior surgery       Objective   Vital Signs:   Temp 97.2 øF (36.2 øC)   Ht 167.6 cm (66\")   Wt 87.1 kg (192 lb)   BMI 30.99 kg/mý     Physical Exam  Physical Exam  Vitals signs and nursing note reviewed.   Constitutional:       Appearance: Normal appearance.   Pulmonary:      Effort: Pulmonary effort is normal.   Skin:     General: Skin is warm and dry.      Capillary Refill: Capillary refill takes less than 2 seconds.   Neurological:      General: No focal deficit present.      Mental Status: He is alert and oriented to person, place, and time. Mental status is at baseline.   Psychiatric:         Mood and Affect: Mood normal.         Behavior: Behavior normal.         " Thought Content: Thought content normal.         Judgment: Judgment normal.     Ortho Exam   Right thumb: There is no clinical deformity. Patient is neurovascularly intact. Flexor and extensor tendon function are both well preserved. There is no evidence of a localized infection. She has full flexion at the MCP and the IP joints of the thumb. She is able to circumduct the thumb without any difficulty.     Result Review :  The following data was reviewed by: Steven Sue MD on 08/10/2023:  Radiologic studies - see below for interpretation  no diagnostic testing performed this visit            Procedures           Assessment   Assessment and Plan    Diagnoses and all orders for this visit:    1. S/P excision of ganglion cyst (Primary)    2. Ganglion, finger joint of right hand          Follow Up   Compression/brace if there is recurrence of the mass.  Calcium and vitamin D for bone health.  Tablet Cosamin, chondroitin and turmeric for cartilage health.  Rest, ice, compression, and elevation (RICE) therapy  Stretching and strengthening exercises  OTC Alternate Ibuprofen and Tylenol as needed  Follow up as needed  Patient was given instructions and counseling regarding her condition or for health maintenance advice. Please see specific information pulled into the AVS if appropriate.     Steven Sue MD   Date of Encounter: 8/10/2023   Electronically signed by Steven Sue MD, 08/10/23, 8:05 AM EDT.     EMR Dragon/Transcription disclaimer:  Much of this encounter note is an electronic transcription/translation of spoken language to printed text. The electronic translation of spoken language may permit erroneous, or at times, nonsensical words or phrases to be inadvertently transcribed; Although I have reviewed the note for such errors, some may still exist.

## (undated) DEVICE — SKIN PREP TRAY W/CHG: Brand: MEDLINE INDUSTRIES, INC.

## (undated) DEVICE — UNDERCAST PADDING: Brand: DEROYAL

## (undated) DEVICE — APPL CHLORAPREP HI/LITE 26ML ORNG

## (undated) DEVICE — BNDG ELAS MATRX  2IN 5YD LF STRL

## (undated) DEVICE — BNDG ELAS ELITE V/CLOSE 6IN 5YD LF STRL

## (undated) DEVICE — SPNG GZ WOVN 4X4IN 12PLY 10/BX STRL

## (undated) DEVICE — SUT VIC 0 CT1 CR8 18IN J840D

## (undated) DEVICE — WEBRIL* CAST PADDING: Brand: DEROYAL

## (undated) DEVICE — STRL PENROSE DRAIN 18" X 1/4": Brand: CARDINAL HEALTH

## (undated) DEVICE — ANTIBACTERIAL UNDYED BRAIDED (POLYGLACTIN 910), SYNTHETIC ABSORBABLE SUTURE: Brand: COATED VICRYL

## (undated) DEVICE — DRSNG WND GZ CURAD OIL EMULSION 3X8IN LF STRL 1PK

## (undated) DEVICE — GLV SURG PREMIERPRO ORTHO LTX PF SZ8 BRN

## (undated) DEVICE — SUT MNCRYL PLS ANTIB UD 4/0 PS2 18IN

## (undated) DEVICE — BNDG ELAS ELITE V/CLOSE 4IN 5YD LF STRL

## (undated) DEVICE — PK ORTHO MINOR 40